# Patient Record
Sex: FEMALE | Race: OTHER | HISPANIC OR LATINO | Employment: OTHER | URBAN - METROPOLITAN AREA
[De-identification: names, ages, dates, MRNs, and addresses within clinical notes are randomized per-mention and may not be internally consistent; named-entity substitution may affect disease eponyms.]

---

## 2018-07-13 ENCOUNTER — TRANSCRIBE ORDERS (OUTPATIENT)
Dept: ADMINISTRATIVE | Facility: HOSPITAL | Age: 53
End: 2018-07-13

## 2018-07-13 ENCOUNTER — APPOINTMENT (OUTPATIENT)
Dept: LAB | Facility: HOSPITAL | Age: 53
End: 2018-07-13
Payer: COMMERCIAL

## 2018-07-13 DIAGNOSIS — E55.9 VITAMIN D DEFICIENCY DISEASE: ICD-10-CM

## 2018-07-13 DIAGNOSIS — E03.4 HYPOTHYROIDISM DUE TO ACQUIRED ATROPHY OF THYROID: ICD-10-CM

## 2018-07-13 DIAGNOSIS — E03.4 HYPOTHYROIDISM DUE TO ACQUIRED ATROPHY OF THYROID: Primary | ICD-10-CM

## 2018-07-13 LAB
25(OH)D3 SERPL-MCNC: 45.6 NG/ML (ref 30–100)
CHOLEST SERPL-MCNC: 319 MG/DL
HDLC SERPL-MCNC: 48 MG/DL (ref 40–59)
LDLC SERPL CALC-MCNC: 199 MG/DL
NONHDLC SERPL-MCNC: 271 MG/DL
TRIGL SERPL-MCNC: 361 MG/DL
TSH SERPL DL<=0.05 MIU/L-ACNC: 48.7 UIU/ML (ref 0.47–4.68)

## 2018-07-13 PROCEDURE — 36415 COLL VENOUS BLD VENIPUNCTURE: CPT

## 2018-07-13 PROCEDURE — 80061 LIPID PANEL: CPT

## 2018-07-13 PROCEDURE — 82306 VITAMIN D 25 HYDROXY: CPT

## 2018-07-13 PROCEDURE — 84443 ASSAY THYROID STIM HORMONE: CPT

## 2018-07-25 DIAGNOSIS — R00.0 INAPPROPRIATE SINUS TACHYCARDIA: Primary | ICD-10-CM

## 2018-11-13 DIAGNOSIS — E78.2 MIXED HYPERLIPIDEMIA: Primary | ICD-10-CM

## 2018-11-19 RX ORDER — ATORVASTATIN CALCIUM 10 MG/1
TABLET, FILM COATED ORAL
Qty: 30 TABLET | Refills: 0 | Status: SHIPPED | OUTPATIENT
Start: 2018-11-19 | End: 2018-12-18 | Stop reason: SDUPTHER

## 2018-12-03 DIAGNOSIS — R00.0 INAPPROPRIATE SINUS TACHYCARDIA: ICD-10-CM

## 2018-12-11 DIAGNOSIS — R00.0 TACHYCARDIA: Primary | ICD-10-CM

## 2018-12-11 RX ORDER — VERAPAMIL HYDROCHLORIDE 240 MG/1
240 TABLET, FILM COATED, EXTENDED RELEASE ORAL DAILY
Qty: 30 TABLET | Refills: 6 | Status: SHIPPED | OUTPATIENT
Start: 2018-12-11

## 2018-12-17 DIAGNOSIS — E78.2 MIXED HYPERLIPIDEMIA: ICD-10-CM

## 2018-12-18 RX ORDER — ATORVASTATIN CALCIUM 10 MG/1
TABLET, FILM COATED ORAL
Qty: 30 TABLET | Refills: 0 | Status: SHIPPED | OUTPATIENT
Start: 2018-12-18 | End: 2019-01-16 | Stop reason: SDUPTHER

## 2019-01-16 DIAGNOSIS — E78.2 MIXED HYPERLIPIDEMIA: ICD-10-CM

## 2019-01-16 RX ORDER — ATORVASTATIN CALCIUM 10 MG/1
TABLET, FILM COATED ORAL
Qty: 30 TABLET | Refills: 0 | Status: SHIPPED | OUTPATIENT
Start: 2019-01-16 | End: 2019-02-13 | Stop reason: SDUPTHER

## 2019-02-13 DIAGNOSIS — E78.2 MIXED HYPERLIPIDEMIA: ICD-10-CM

## 2019-02-13 RX ORDER — ATORVASTATIN CALCIUM 10 MG/1
TABLET, FILM COATED ORAL
Qty: 30 TABLET | Refills: 0 | Status: SHIPPED | OUTPATIENT
Start: 2019-02-13 | End: 2019-03-13 | Stop reason: SDUPTHER

## 2019-03-13 DIAGNOSIS — E78.2 MIXED HYPERLIPIDEMIA: ICD-10-CM

## 2019-03-13 RX ORDER — ATORVASTATIN CALCIUM 10 MG/1
TABLET, FILM COATED ORAL
Qty: 30 TABLET | Refills: 0 | Status: SHIPPED | OUTPATIENT
Start: 2019-03-13 | End: 2019-04-14 | Stop reason: SDUPTHER

## 2019-04-13 DIAGNOSIS — E78.2 MIXED HYPERLIPIDEMIA: ICD-10-CM

## 2019-04-14 RX ORDER — ATORVASTATIN CALCIUM 10 MG/1
TABLET, FILM COATED ORAL
Qty: 30 TABLET | Refills: 0 | Status: SHIPPED | OUTPATIENT
Start: 2019-04-14 | End: 2019-05-13 | Stop reason: SDUPTHER

## 2019-05-13 DIAGNOSIS — E78.2 MIXED HYPERLIPIDEMIA: ICD-10-CM

## 2019-05-13 RX ORDER — ATORVASTATIN CALCIUM 10 MG/1
TABLET, FILM COATED ORAL
Qty: 30 TABLET | Refills: 0 | Status: SHIPPED | OUTPATIENT
Start: 2019-05-13 | End: 2019-06-18 | Stop reason: SDUPTHER

## 2019-06-18 DIAGNOSIS — E78.2 MIXED HYPERLIPIDEMIA: ICD-10-CM

## 2019-06-20 RX ORDER — ATORVASTATIN CALCIUM 10 MG/1
TABLET, FILM COATED ORAL
Qty: 30 TABLET | Refills: 0 | Status: SHIPPED | OUTPATIENT
Start: 2019-06-20 | End: 2019-07-28 | Stop reason: SDUPTHER

## 2019-07-28 DIAGNOSIS — E78.2 MIXED HYPERLIPIDEMIA: ICD-10-CM

## 2019-08-07 RX ORDER — ATORVASTATIN CALCIUM 10 MG/1
TABLET, FILM COATED ORAL
Qty: 30 TABLET | Refills: 0 | Status: SHIPPED | OUTPATIENT
Start: 2019-08-07

## 2020-03-03 ENCOUNTER — APPOINTMENT (EMERGENCY)
Dept: RADIOLOGY | Facility: HOSPITAL | Age: 55
End: 2020-03-03
Payer: COMMERCIAL

## 2020-03-03 ENCOUNTER — APPOINTMENT (EMERGENCY)
Dept: CT IMAGING | Facility: HOSPITAL | Age: 55
End: 2020-03-03
Payer: COMMERCIAL

## 2020-03-03 ENCOUNTER — HOSPITAL ENCOUNTER (EMERGENCY)
Facility: HOSPITAL | Age: 55
Discharge: HOME | End: 2020-03-03
Attending: EMERGENCY MEDICINE | Admitting: EMERGENCY MEDICINE
Payer: COMMERCIAL

## 2020-03-03 VITALS
SYSTOLIC BLOOD PRESSURE: 158 MMHG | TEMPERATURE: 98.2 F | DIASTOLIC BLOOD PRESSURE: 93 MMHG | OXYGEN SATURATION: 98 % | HEART RATE: 96 BPM | RESPIRATION RATE: 16 BRPM | WEIGHT: 148.15 LBS

## 2020-03-03 DIAGNOSIS — I10 HYPERTENSION: ICD-10-CM

## 2020-03-03 DIAGNOSIS — R07.89 ATYPICAL CHEST PAIN: Primary | ICD-10-CM

## 2020-03-03 LAB
ALBUMIN SERPL BCP-MCNC: 4.8 G/DL (ref 3–5.2)
ALP SERPL-CCNC: 59 U/L (ref 43–122)
ALT SERPL W P-5'-P-CCNC: 27 U/L (ref 9–52)
ANION GAP SERPL CALCULATED.3IONS-SCNC: 11 MMOL/L (ref 5–14)
AST SERPL W P-5'-P-CCNC: 23 U/L (ref 14–36)
ATRIAL RATE: 112 BPM
BACTERIA UR QL AUTO: ABNORMAL /HPF
BASOPHILS # BLD AUTO: 0.1 THOUSANDS/ΜL (ref 0–0.1)
BASOPHILS NFR BLD AUTO: 1 % (ref 0–1)
BILIRUB SERPL-MCNC: 0.3 MG/DL
BILIRUB UR QL STRIP: NEGATIVE
BUN SERPL-MCNC: 12 MG/DL (ref 5–25)
CALCIUM SERPL-MCNC: 10.2 MG/DL (ref 8.4–10.2)
CHLORIDE SERPL-SCNC: 103 MMOL/L (ref 97–108)
CLARITY UR: CLEAR
CO2 SERPL-SCNC: 27 MMOL/L (ref 22–30)
COLOR UR: YELLOW
CREAT SERPL-MCNC: 0.69 MG/DL (ref 0.6–1.2)
D DIMER PPP FEU-MCNC: <0.19 UG/ML FEU
EOSINOPHIL # BLD AUTO: 0.2 THOUSAND/ΜL (ref 0–0.4)
EOSINOPHIL NFR BLD AUTO: 2 % (ref 0–6)
ERYTHROCYTE [DISTWIDTH] IN BLOOD BY AUTOMATED COUNT: 14 %
FLUAV RNA NPH QL NAA+PROBE: NORMAL
FLUBV RNA NPH QL NAA+PROBE: NORMAL
GFR SERPL CREATININE-BSD FRML MDRD: 100 ML/MIN/1.73SQ M
GLUCOSE SERPL-MCNC: 104 MG/DL (ref 70–99)
GLUCOSE UR STRIP-MCNC: NEGATIVE MG/DL
HCT VFR BLD AUTO: 40.7 % (ref 36–46)
HGB BLD-MCNC: 13.8 G/DL (ref 12–16)
HGB UR QL STRIP.AUTO: 250
KETONES UR STRIP-MCNC: NEGATIVE MG/DL
LEUKOCYTE ESTERASE UR QL STRIP: NEGATIVE
LYMPHOCYTES # BLD AUTO: 3.2 THOUSANDS/ΜL (ref 0.5–4)
LYMPHOCYTES NFR BLD AUTO: 38 % (ref 25–45)
MCH RBC QN AUTO: 30.6 PG (ref 26–34)
MCHC RBC AUTO-ENTMCNC: 33.9 G/DL (ref 31–36)
MCV RBC AUTO: 90 FL (ref 80–100)
MONOCYTES # BLD AUTO: 0.8 THOUSAND/ΜL (ref 0.2–0.9)
MONOCYTES NFR BLD AUTO: 10 % (ref 1–10)
MUCOUS THREADS UR QL AUTO: ABNORMAL
NEUTROPHILS # BLD AUTO: 4.1 THOUSANDS/ΜL (ref 1.8–7.8)
NEUTS SEG NFR BLD AUTO: 49 % (ref 45–65)
NITRITE UR QL STRIP: NEGATIVE
NON-SQ EPI CELLS URNS QL MICRO: ABNORMAL /HPF
P AXIS: 68 DEGREES
PH UR STRIP.AUTO: 7 [PH]
PLATELET # BLD AUTO: 443 THOUSANDS/UL (ref 150–450)
PMV BLD AUTO: 8.1 FL (ref 8.9–12.7)
POTASSIUM SERPL-SCNC: 3.6 MMOL/L (ref 3.6–5)
PR INTERVAL: 118 MS
PROT SERPL-MCNC: 9 G/DL (ref 5.9–8.4)
PROT UR STRIP-MCNC: ABNORMAL MG/DL
QRS AXIS: 57 DEGREES
QRSD INTERVAL: 80 MS
QT INTERVAL: 342 MS
QTC INTERVAL: 466 MS
RBC # BLD AUTO: 4.51 MILLION/UL (ref 4–5.2)
RBC #/AREA URNS AUTO: ABNORMAL /HPF
RSV RNA NPH QL NAA+PROBE: NORMAL
SODIUM SERPL-SCNC: 141 MMOL/L (ref 137–147)
SP GR UR STRIP.AUTO: 1.01 (ref 1–1.04)
T WAVE AXIS: 55 DEGREES
TROPONIN I SERPL-MCNC: <0.01 NG/ML (ref 0–0.03)
TROPONIN I SERPL-MCNC: <0.01 NG/ML (ref 0–0.03)
TSH SERPL DL<=0.05 MIU/L-ACNC: 4.4 UIU/ML (ref 0.47–4.68)
UROBILINOGEN UA: NEGATIVE MG/DL
VENTRICULAR RATE: 112 BPM
WBC # BLD AUTO: 8.5 THOUSAND/UL (ref 4.5–11)
WBC #/AREA URNS AUTO: ABNORMAL /HPF

## 2020-03-03 PROCEDURE — 85025 COMPLETE CBC W/AUTO DIFF WBC: CPT | Performed by: PHYSICIAN ASSISTANT

## 2020-03-03 PROCEDURE — 85379 FIBRIN DEGRADATION QUANT: CPT | Performed by: PHYSICIAN ASSISTANT

## 2020-03-03 PROCEDURE — 84443 ASSAY THYROID STIM HORMONE: CPT | Performed by: PHYSICIAN ASSISTANT

## 2020-03-03 PROCEDURE — 81003 URINALYSIS AUTO W/O SCOPE: CPT | Performed by: PHYSICIAN ASSISTANT

## 2020-03-03 PROCEDURE — 93005 ELECTROCARDIOGRAM TRACING: CPT

## 2020-03-03 PROCEDURE — 71046 X-RAY EXAM CHEST 2 VIEWS: CPT

## 2020-03-03 PROCEDURE — 81001 URINALYSIS AUTO W/SCOPE: CPT | Performed by: PHYSICIAN ASSISTANT

## 2020-03-03 PROCEDURE — 36415 COLL VENOUS BLD VENIPUNCTURE: CPT | Performed by: PHYSICIAN ASSISTANT

## 2020-03-03 PROCEDURE — 93010 ELECTROCARDIOGRAM REPORT: CPT | Performed by: INTERNAL MEDICINE

## 2020-03-03 PROCEDURE — 87631 RESP VIRUS 3-5 TARGETS: CPT | Performed by: PHYSICIAN ASSISTANT

## 2020-03-03 PROCEDURE — 96361 HYDRATE IV INFUSION ADD-ON: CPT

## 2020-03-03 PROCEDURE — 99285 EMERGENCY DEPT VISIT HI MDM: CPT

## 2020-03-03 PROCEDURE — 70450 CT HEAD/BRAIN W/O DYE: CPT

## 2020-03-03 PROCEDURE — 80053 COMPREHEN METABOLIC PANEL: CPT | Performed by: PHYSICIAN ASSISTANT

## 2020-03-03 PROCEDURE — 99285 EMERGENCY DEPT VISIT HI MDM: CPT | Performed by: PHYSICIAN ASSISTANT

## 2020-03-03 PROCEDURE — 84484 ASSAY OF TROPONIN QUANT: CPT | Performed by: PHYSICIAN ASSISTANT

## 2020-03-03 PROCEDURE — 96374 THER/PROPH/DIAG INJ IV PUSH: CPT

## 2020-03-03 RX ORDER — LORAZEPAM 2 MG/ML
0.5 INJECTION INTRAMUSCULAR ONCE
Status: COMPLETED | OUTPATIENT
Start: 2020-03-03 | End: 2020-03-03

## 2020-03-03 RX ORDER — SODIUM CHLORIDE 9 MG/ML
1000 INJECTION, SOLUTION INTRAVENOUS ONCE
Status: COMPLETED | OUTPATIENT
Start: 2020-03-03 | End: 2020-03-03

## 2020-03-03 RX ADMIN — SODIUM CHLORIDE 1000 ML/HR: 0.9 INJECTION, SOLUTION INTRAVENOUS at 09:12

## 2020-03-03 RX ADMIN — LORAZEPAM 0.5 MG: 2 INJECTION INTRAMUSCULAR; INTRAVENOUS at 09:11

## 2020-03-03 NOTE — ED PROVIDER NOTES
History  Chief Complaint   Patient presents with    Chest Pain     states past 2 days heart has been going fast; states 3 years ago the same thing happened and told heart was small so it beats fast; states feeling headache and nauseous this morning with chest pains; chest pains have been on and off over past 2 days  Patient is a 82-year-old female with past medical history inappropriate sinus tachycardia previously on verapamil, history of migraines, COPD, and dyslipidemia who presented today for evaluation of left-sided chest discomfort associated with palpitations that has been present for the past 2 days  Patient followed up with Cardiology in the past and was diagnosed with sinus tachycardia  She reports no significant shortness of breath  She denies any nausea or vomiting  She reports the sensation of her heart beating very fast or palpitations with associated left-sided chest discomfort with squeezing  Chest pain is not exertional   Patient has allergy to aspirin  Patient denies any significant distal sensation or tingling  She reports no pain radiation to her back  No pain radiation down her arm  Prior to Admission Medications   Prescriptions Last Dose Informant Patient Reported? Taking? Methylprednisolone 4 MG TBPK   Yes No   Sig: TK UTD   SUMAtriptan (IMITREX) 50 mg tablet   Yes No   Sig: take 1 tablet by oral route after onset of migraine; may repeat after 2 hours if headache returns,not to exceed 200mg in 24hrs   VENTOLIN  (90 Base) MCG/ACT inhaler   Yes No   Sig: INL 2 PFS PO Q 4 H UTD   albuterol (2 5 mg/3 mL) 0 083 % nebulizer solution   Yes No   Sig: Every six hrs   as needed   albuterol (PROVENTIL HFA) 90 mcg/act inhaler   Yes No   Sig: every 6 (six) hours   atorvastatin (LIPITOR) 10 mg tablet   No No   Sig: TAKE 1 TABLET BY MOUTH EVERY DAY   ergocalciferol (VITAMIN D2) 50,000 units   Yes No   Sig: TK 1 C PO 1 TIME A WK   fenofibrate (TRICOR) 145 mg tablet   Yes No Sig: TK 1 T PO D   fluticasone (FLONASE) 50 mcg/act nasal spray   Yes No   Sig: SHAKE LQ AND U 1 TO 2 SPRAYS IEN QAM   guaiFENesin-dextromethorphan (MUCINEX DM) 600-30 mg   Yes No   Sig: TK 1 T PO BID IN THE MORNING AND AT NIGHT PRN   levothyroxine 50 mcg tablet   Yes No   metaxalone (SKELAXIN) 400 MG tablet   Yes No   mometasone-formoterol (DULERA) 200-5 MCG/ACT inhaler   Yes No   Sig: Inhale 2 puffs   montelukast (SINGULAIR) 10 mg tablet   Yes No   Sig: Take 10 mg by mouth   montelukast (SINGULAIR) 10 mg tablet   Yes No   Sig: TK 1 T PO QD   omeprazole (PRILOSEC) 20 mg delayed release capsule   Yes No   Sig: Every 12 hours   omeprazole (PriLOSEC) 20 mg delayed release capsule   Yes No   Sig: TK 1 C PO BID   oxyCODONE-acetaminophen (PERCOCET) 5-325 mg per tablet   Yes No   Sig: TK 1 T PO Q 8 H PRF SEVERE PAIN   predniSONE 10 mg tablet   Yes No   Sig: Take 10 mg by mouth   ranolazine (RANEXA) 500 mg 12 hr tablet   Yes No   Sig: Every 12 hours   theophylline (THEODUR) 200 mg 12 hr tablet   Yes No   Sig: take 1 tablet by oral route  every 12 hours (only takes daily)   topiramate (TOPAMAX) 25 mg tablet   Yes No   Sig: TK 1 T PO BID   traMADol (ULTRAM) 50 mg tablet   Yes No   Sig: TK 1 T PO  Q 8 H PRF MODERATE P   verapamil (CALAN-SR) 240 mg CR tablet   No No   Sig: Take 1 tablet (240 mg total) by mouth daily      Facility-Administered Medications: None       Past Medical History:   Diagnosis Date    Migraines 09/24/2008       Past Surgical History:   Procedure Laterality Date    BACK SURGERY      HYSTERECTOMY      TUBAL LIGATION         Family History   Problem Relation Age of Onset    Asthma Mother     Diabetes Father         Mellitus     I have reviewed and agree with the history as documented      E-Cigarette/Vaping    E-Cigarette Use Never User      E-Cigarette/Vaping Substances     Social History     Tobacco Use    Smoking status: Former Smoker    Smokeless tobacco: Never Used   Substance Use Topics    Alcohol use: Never     Frequency: Never    Drug use: Never       Review of Systems   Constitutional: Negative  HENT: Negative  Eyes: Negative  Respiratory: Positive for chest tightness  Cardiovascular: Positive for chest pain and palpitations  Gastrointestinal: Negative  Endocrine: Negative  Genitourinary: Negative  Musculoskeletal: Negative  Skin: Negative  Allergic/Immunologic: Negative  Neurological: Negative  Hematological: Negative  Psychiatric/Behavioral: Negative  Physical Exam  Physical Exam   Constitutional: She is oriented to person, place, and time  She appears well-developed and well-nourished  Non-toxic appearance  She does not appear ill  No distress  Neck: Normal range of motion  No hepatojugular reflux and no JVD present  No tracheal deviation present  No thyromegaly present  Cardiovascular: Normal rate and regular rhythm  No systolic murmur is present  Pulses:       Radial pulses are 2+ on the right side, and 2+ on the left side  Pulmonary/Chest: Effort normal and breath sounds normal  No respiratory distress  She has no decreased breath sounds  She has no wheezes  Abdominal: Soft  Bowel sounds are normal  She exhibits no distension  There is no tenderness  Musculoskeletal:        Right lower leg: Normal  She exhibits no edema  Left lower leg: Normal  She exhibits no edema  Neurological: She is alert and oriented to person, place, and time  Skin: Capillary refill takes less than 2 seconds  Psychiatric: She has a normal mood and affect  Her behavior is normal  Her mood appears not anxious  She is not agitated         Vital Signs  ED Triage Vitals   Temperature Pulse Respirations Blood Pressure SpO2   03/03/20 0835 03/03/20 0835 03/03/20 0835 03/03/20 0835 03/03/20 0835   98 2 °F (36 8 °C) (!) 120 16 (!) 195/113 99 %      Temp Source Heart Rate Source Patient Position - Orthostatic VS BP Location FiO2 (%)   03/03/20 0835 03/03/20 0835 03/03/20 0914 03/03/20 0835 --   Tympanic Monitor Lying Left arm       Pain Score       03/03/20 0835       7           Vitals:    03/03/20 0914 03/03/20 0955 03/03/20 1213 03/03/20 1311   BP: 154/94 147/93 (!) 171/108 158/93   Pulse: 101 98 101 96   Patient Position - Orthostatic VS: Lying Lying Lying          Visual Acuity      ED Medications  Medications   sodium chloride 0 9 % infusion (0 mL/hr Intravenous Stopped 3/3/20 1016)   LORazepam (ATIVAN) injection 0 5 mg (0 5 mg Intravenous Given 3/3/20 0911)       Diagnostic Studies  Results Reviewed     Procedure Component Value Units Date/Time    Troponin I [[id number]]  (Normal) Collected:  03/03/20 1211    Lab Status:  Final result Specimen:  Blood from Arm, Right Updated:  03/03/20 1244     Troponin I <0 01 ng/mL     TSH [[id number]]  (Normal) Collected:  03/03/20 0906    Lab Status:  Final result Specimen:  Blood from Arm, Right Updated:  03/03/20 1036     TSH [address] [place], [place] [address]TON 4 400 uIU/mL     Narrative:       Patients undergoing fluorescein dye angiography may retain small amounts of fluorescein in the body for 48-72 hours post procedure  Samples containing fluorescein can produce falsely depressed TSH values  If the patient had this procedure,a specimen should be resubmitted post fluorescein clearance        Influenza A/B and RSV PCR [[id number]]  (Normal) Collected:  03/03/20 0905    Lab Status:  Final result Specimen:  Nares from Nose Updated:  03/03/20 1014     INFLUENZA A PCR None Detected     INFLUENZA B PCR None Detected     RSV PCR None Detected    Urine Microscopic [[id number]]  (Abnormal) Collected:  03/03/20 0914    Lab Status:  Final result Specimen:  Urine, Other Updated:  03/03/20 0959     RBC, UA 10-20 /hpf      WBC, UA 0-1 /hpf      Epithelial Cells Moderate /hpf      Bacteria, UA Occasional /hpf      MUCUS THREADS Moderate    Troponin I [[id number]]  (Normal) Collected:  03/03/20 0906    Lab Status:  Final result Specimen:  Blood from Arm, Right Updated:  03/03/20 0952     Troponin I <0 01 ng/mL     D-dimer, quantitative [[id number]]  (Normal) Collected:  03/03/20 0906    Lab Status:  Final result Specimen:  Blood from Arm, Right Updated:  03/03/20 0946     D-Dimer, Quant <0 19 ug/ml FEU     UA w Reflex to Microscopic w Reflex to Culture [[id number]]  (Abnormal) Collected:  03/03/20 0914    Lab Status:  Final result Specimen:  Urine, Other Updated:  03/03/20 0946     Color, UA Yellow     Clarity, UA Clear     Specific Gravity, UA 1 010     pH, UA 7 0     Leukocytes, UA Negative     Nitrite, UA Negative     Protein, UA 30 (1+) mg/dl      Glucose, UA Negative mg/dl      Ketones, UA Negative mg/dl      Bilirubin, UA Negative     Blood,  0     UROBILINOGEN UA Negative mg/dL     CBC and differential [[id number]]  (Abnormal) Collected:  03/03/20 0906    Lab Status:  Final result Specimen:  Blood from Arm, Right Updated:  03/03/20 0939     WBC 8 50 Thousand/uL      RBC 4 51 Million/uL      Hemoglobin 13 8 g/dL      Hematocrit 40 7 %      MCV 90 fL      MCH 30 6 pg      MCHC 33 9 g/dL      RDW 14 0 %      MPV 8 1 fL      Platelets 468 Thousands/uL      Neutrophils Relative 49 %      Lymphocytes Relative 38 %      Monocytes Relative 10 %      Eosinophils Relative 2 %      Basophils Relative 1 %      Neutrophils Absolute 4 10 Thousands/µL      Lymphocytes Absolute 3 20 Thousands/µL      Monocytes Absolute 0 80 Thousand/µL      Eosinophils Absolute 0 20 Thousand/µL      Basophils Absolute 0 10 Thousands/µL     Comprehensive metabolic panel [[id number]]  (Abnormal) Collected:  03/03/20 0906    Lab Status:  Final result Specimen:  Blood from Arm, Right Updated:  03/03/20 0937     Sodium 141 mmol/L      Potassium 3 6 mmol/L      Chloride 103 mmol/L      CO2 27 mmol/L      ANION GAP 11 mmol/L      BUN 12 mg/dL      Creatinine 0 69 mg/dL      Glucose 104 mg/dL      Calcium 10 2 mg/dL      AST 23 U/L      ALT 27 U/L      Alkaline Phosphatase 59 U/L      Total Protein 9 0 g/dL      Albumin 4 8 g/dL      Total Bilirubin 0 30 mg/dL      eGFR 100 ml/min/1 73sq m     Narrative:       [place] guidelines for Chronic Kidney Disease (CKD):     Stage 1 with normal or high GFR (GFR > 90 mL/min/1 73 square meters)    Stage 2 Mild CKD (GFR = 60-89 mL/min/1 73 square meters)    Stage 3A Moderate CKD (GFR = 45-59 mL/min/1 73 square meters)    Stage 3B Moderate CKD (GFR = 30-44 mL/min/1 73 square meters)    Stage 4 Severe CKD (GFR = 15-29 mL/min/1 73 square meters)    Stage 5 End Stage CKD (GFR <15 mL/min/1 73 square meters)  Note: GFR calculation is accurate only with a steady state creatinine                 XR chest 2 views   Final Result by Cyndi Rene MD (03/03 1301)      No acute cardiopulmonary disease  Workstation performed: [id number]         CT head without contrast   Final Result by Leana Ortiz MD (03/03 1102)      No acute intracranial abnormality                    Workstation performed: [id number]                    Procedures  ECG 12 Lead Documentation Only  Date/Time: 3/3/2020 9:03 AM  Performed by: Sruthi Hanna PA-C  Authorized by: Sruthi Hanna PA-C     Indications / Diagnosis:  Palpitations/chest pain  Patient location:  ED  Interpretation:     Interpretation: normal    Rate:     ECG rate:  112    ECG rate assessment: tachycardic    Rhythm:     Rhythm: sinus rhythm    Ectopy:     Ectopy: none    ST segments:     ST segments:  Normal  T waves:     T waves: normal               ED Course         HEART Risk Score      Most Recent Value   Heart Score Risk Calculator   History  1 Filed at: 03/03/2020 1255   ECG  0 Filed at: 03/03/2020 1255   Age  1 Filed at: 03/03/2020 1255   Risk Factors  1 Filed at: 03/03/2020 1255   Troponin  0 Filed at: 03/03/2020 1255   HEART Score  3 Filed at: 03/03/2020 1255                            MDM  Number of Diagnoses or Management Options  Atypical chest pain:   Hypertension: Diagnosis management comments: Patient is a 61-year-old female presents today for evaluation of chest discomfort  Her heart score is a 3  Her chest pain is nonexertional   EKG is unremarkable  Patient has been seen by cardiology in the past for her sinus tachycardia and was started on verapamil  Patient reports she is currently not taking this medication  Patient was offered admission but opted to follow-up as an outpatient  Patient was given a script for a stress echocardiogram and told to follow-up in the emergency department if her symptoms persisted or worsened  Patient reports that symptoms completely resolved with Ativan  Renal function normal   Troponin x2 negative  D-dimer negative  TSH within normal limits  Patient discharged home with recommendations follow-up with primary care provider with within 1-2 days  Disposition  Final diagnoses:   Atypical chest pain   Hypertension     Time reflects when diagnosis was documented in both MDM as applicable and the Disposition within this note     Time User Action Codes Description Comment    3/3/2020 12:50 PM Brittany Keys Add [R07 89] Atypical chest pain     3/3/2020 12:54 PM [employer] Sports R Add [I10] Hypertension       ED Disposition     ED Disposition Condition Date/Time Comment    Discharge Stable Tue Mar 3, 2020 12:50 PM [facility] discharge to home/self care              Follow-up Information     Follow up With Specialties Details Why Contact Info Additional Information    Sonia Mathew MD Internal Medicine Schedule an appointment as soon as possible for a visit   [address] [facility] [address] [facility] Emergency Department Emergency Medicine  If symptoms worsen [address] [address]  [place] [address]  Cardiology Schedule an appointment as soon as possible for a visit   [facility] [address]  [address] [facility], [address], [facility], [place], [address] [phone]          Discharge Medication List as of 3/3/2020 12:55 PM      CONTINUE these medications which have NOT CHANGED    Details   albuterol (2 5 mg/3 mL) 0 083 % nebulizer solution Every six hrs   as needed, Historical Med      !! albuterol (PROVENTIL HFA) 90 mcg/act inhaler every 6 (six) hours, Starting Mon 7/21/2014, Historical Med      atorvastatin (LIPITOR) 10 mg tablet TAKE 1 TABLET BY MOUTH EVERY DAY, Normal      ergocalciferol (VITAMIN D2) 50,000 units TK 1 C PO 1 TIME A WK, Historical Med      fenofibrate (TRICOR) 145 mg tablet TK 1 T PO D, Historical Med      fluticasone (FLONASE) 50 mcg/act nasal spray SHAKE LQ AND U 1 TO 2 SPRAYS IEN QAM, Historical Med      guaiFENesin-dextromethorphan (MUCINEX DM) 600-30 mg TK 1 T PO BID IN THE MORNING AND AT NIGHT PRN, Historical Med      levothyroxine 50 mcg tablet Starting Mon 10/15/2018, Historical Med      metaxalone (SKELAXIN) 400 MG tablet Starting Tue 8/14/2018, Historical Med      Methylprednisolone 4 MG TBPK TK UTD, Historical Med      mometasone-formoterol (DULERA) 200-5 MCG/ACT inhaler Inhale 2 puffs, Starting Mon 10/5/2015, Historical Med      !! montelukast (SINGULAIR) 10 mg tablet Take 10 mg by mouth, Starting Thu 7/30/2015, Historical Med      !! montelukast (SINGULAIR) 10 mg tablet TK 1 T PO QD, Historical Med      !! omeprazole (PriLOSEC) 20 mg delayed release capsule TK 1 C PO BID, Historical Med      !! omeprazole (PRILOSEC) 20 mg delayed release capsule Every 12 hours, Starting Mon 7/21/2014, Historical Med      oxyCODONE-acetaminophen (PERCOCET) 5-325 mg per tablet TK 1 T PO Q 8 H PRF SEVERE PAIN, Historical Med      predniSONE 10 mg tablet Take 10 mg by mouth, Starting Thu 7/2/2015, Historical Med      ranolazine (RANEXA) 500 mg 12 hr tablet Every 12 hours, Starting Mon 4/30/2018, Historical Med      SUMAtriptan (IMITREX) 50 mg tablet take 1 tablet by oral route after onset of migraine; may repeat after 2 hours if headache returns,not to exceed 200mg in 24hrs, Historical Med      theophylline (THEODUR) 200 mg 12 hr tablet take 1 tablet by oral route  every 12 hours (only takes daily), Historical Med      topiramate (TOPAMAX) 25 mg tablet TK 1 T PO BID, Historical Med      traMADol (ULTRAM) 50 mg tablet TK 1 T PO  Q 8 H PRF MODERATE P, Historical Med      !! VENTOLIN  (90 Base) MCG/ACT inhaler INL 2 PFS PO Q 4 H UTD, Historical Med      verapamil (CALAN-SR) 240 mg CR tablet Take 1 tablet (240 mg total) by mouth daily, Starting Tue 12/11/2018, Normal       !! - Potential duplicate medications found  Please discuss with provider  Outpatient Discharge Orders   Echo stress test w contrast if indicated   Standing Status: Future Standing Exp   Date: 03/03/24       PDMP Review     None          ED Provider  Electronically Signed by           Abel Hernandez PA-C  03/03/20 5055

## 2020-03-04 NOTE — QUICK NOTE
ADDENDUM 3/3/2020 -     Patient reported that she has had persistent chest pain over the past 2 days  She reports chest pain is squeezing and midsternal to L precordium  Chest pain is not exertional  No n/v/d  +palpitations  Patient reports she has been checking her HR and it has been elevated at 113  She denies any syncope/presyncope  No LE edema  No numbness tingling  A/P  Patient has heart score of 3  Her symptoms resolved with ativan  She reports that she did not want to be admitted for further evaluation  Will d/c home with Rx for stress test to follow up as an outpatient

## 2021-07-05 ENCOUNTER — HOSPITAL ENCOUNTER (EMERGENCY)
Facility: HOSPITAL | Age: 56
Discharge: HOME | End: 2021-07-05
Attending: EMERGENCY MEDICINE
Payer: COMMERCIAL

## 2021-07-05 ENCOUNTER — APPOINTMENT (EMERGENCY)
Dept: RADIOLOGY | Facility: HOSPITAL | Age: 56
End: 2021-07-05
Payer: COMMERCIAL

## 2021-07-05 VITALS
DIASTOLIC BLOOD PRESSURE: 117 MMHG | OXYGEN SATURATION: 99 % | RESPIRATION RATE: 20 BRPM | SYSTOLIC BLOOD PRESSURE: 180 MMHG | TEMPERATURE: 97.6 F | WEIGHT: 160.1 LBS | HEART RATE: 103 BPM

## 2021-07-05 DIAGNOSIS — S90.32XA CONTUSION OF LEFT FOOT, INITIAL ENCOUNTER: Primary | ICD-10-CM

## 2021-07-05 PROCEDURE — 99283 EMERGENCY DEPT VISIT LOW MDM: CPT

## 2021-07-05 PROCEDURE — 99284 EMERGENCY DEPT VISIT MOD MDM: CPT | Performed by: EMERGENCY MEDICINE

## 2021-07-05 PROCEDURE — 73630 X-RAY EXAM OF FOOT: CPT

## 2021-07-05 NOTE — ED PROVIDER NOTES
History  Chief Complaint   Patient presents with    Foot Pain     Pt reports that she hit the 5th toe of her left foot and pain has extended to rest of the foot  Pt took tylenol with no relief  Patient is a 61-year-old female who presents with 3 day history of left foot pain after slipping all trying to get a car and striking  Is able to ambulate  Complaining pain at the area no relief with Tylenol  No numbness weakness or tingling  Is able to ambulate  Prior to Admission Medications   Prescriptions Last Dose Informant Patient Reported? Taking? Methylprednisolone 4 MG TBPK   Yes No   Sig: TK UTD   SUMAtriptan (IMITREX) 50 mg tablet   Yes No   Sig: take 1 tablet by oral route after onset of migraine; may repeat after 2 hours if headache returns,not to exceed 200mg in 24hrs   VENTOLIN  (90 Base) MCG/ACT inhaler   Yes No   Sig: INL 2 PFS PO Q 4 H UTD   albuterol (2 5 mg/3 mL) 0 083 % nebulizer solution   Yes No   Sig: Every six hrs   as needed   albuterol (PROVENTIL HFA) 90 mcg/act inhaler   Yes No   Sig: every 6 (six) hours   atorvastatin (LIPITOR) 10 mg tablet   No No   Sig: TAKE 1 TABLET BY MOUTH EVERY DAY   ergocalciferol (VITAMIN D2) 50,000 units   Yes No   Sig: TK 1 C PO 1 TIME A WK   fenofibrate (TRICOR) 145 mg tablet   Yes No   Sig: TK 1 T PO D   fluticasone (FLONASE) 50 mcg/act nasal spray   Yes No   Sig: SHAKE LQ AND U 1 TO 2 SPRAYS IEN QAM   guaiFENesin-dextromethorphan (MUCINEX DM) 600-30 mg   Yes No   Sig: TK 1 T PO BID IN THE MORNING AND AT NIGHT PRN   levothyroxine 50 mcg tablet   Yes No   metaxalone (SKELAXIN) 400 MG tablet   Yes No   mometasone-formoterol (DULERA) 200-5 MCG/ACT inhaler   Yes No   Sig: Inhale 2 puffs   montelukast (SINGULAIR) 10 mg tablet   Yes No   Sig: Take 10 mg by mouth   montelukast (SINGULAIR) 10 mg tablet   Yes No   Sig: TK 1 T PO QD   omeprazole (PRILOSEC) 20 mg delayed release capsule   Yes No   Sig: Every 12 hours   omeprazole (PriLOSEC) 20 mg delayed release capsule   Yes No   Sig: TK 1 C PO BID   oxyCODONE-acetaminophen (PERCOCET) 5-325 mg per tablet   Yes No   Sig: TK 1 T PO Q 8 H PRF SEVERE PAIN   predniSONE 10 mg tablet   Yes No   Sig: Take 10 mg by mouth   ranolazine (RANEXA) 500 mg 12 hr tablet   Yes No   Sig: Every 12 hours   theophylline (THEODUR) 200 mg 12 hr tablet   Yes No   Sig: take 1 tablet by oral route  every 12 hours (only takes daily)   topiramate (TOPAMAX) 25 mg tablet   Yes No   Sig: TK 1 T PO BID   traMADol (ULTRAM) 50 mg tablet   Yes No   Sig: TK 1 T PO  Q 8 H PRF MODERATE P   verapamil (CALAN-SR) 240 mg CR tablet   No No   Sig: Take 1 tablet (240 mg total) by mouth daily      Facility-Administered Medications: None       Past Medical History:   Diagnosis Date    Migraines 09/24/2008       Past Surgical History:   Procedure Laterality Date    BACK SURGERY      HYSTERECTOMY      TUBAL LIGATION         Family History   Problem Relation Age of Onset    Asthma Mother     Diabetes Father         Mellitus     I have reviewed and agree with the history as documented  E-Cigarette/Vaping    E-Cigarette Use Never User      E-Cigarette/Vaping Substances     Social History     Tobacco Use    Smoking status: Former Smoker    Smokeless tobacco: Never Used   Vaping Use    Vaping Use: Never used   Substance Use Topics    Alcohol use: Never    Drug use: Never       Review of Systems   Constitutional: Negative  HENT: Negative  Eyes: Negative  Respiratory: Negative  Cardiovascular: Negative  Gastrointestinal: Negative  Endocrine: Negative  Genitourinary: Negative  Musculoskeletal: Positive for arthralgias  Skin: Negative  Allergic/Immunologic: Negative  Neurological: Negative  Hematological: Negative  Psychiatric/Behavioral: Negative  All other systems reviewed and are negative  Physical Exam  Physical Exam  Vitals and nursing note reviewed     Constitutional:       Appearance: Normal appearance  She is normal weight  HENT:      Head: Normocephalic and atraumatic  Cardiovascular:      Rate and Rhythm: Normal rate and regular rhythm  Pulses: Normal pulses  Heart sounds: Normal heart sounds  Musculoskeletal:         General: Swelling, tenderness and signs of injury present  No deformity  Cervical back: Normal range of motion and neck supple  Right lower leg: No edema  Left lower leg: No edema  Feet:    Skin:     General: Skin is warm and dry  Capillary Refill: Capillary refill takes less than 2 seconds  Neurological:      General: No focal deficit present  Mental Status: She is alert and oriented to person, place, and time  Psychiatric:         Mood and Affect: Mood normal          Behavior: Behavior normal          Vital Signs  ED Triage Vitals [07/05/21 1507]   Temperature Pulse Respirations Blood Pressure SpO2   97 6 °F (36 4 °C) 103 20 (!) 180/117 99 %      Temp Source Heart Rate Source Patient Position - Orthostatic VS BP Location FiO2 (%)   Tympanic Monitor Sitting Right arm --      Pain Score       7           Vitals:    07/05/21 1507   BP: (!) 180/117   Pulse: 103   Patient Position - Orthostatic VS: Sitting         Visual Acuity      ED Medications  Medications - No data to display    Diagnostic Studies  Results Reviewed     None                 XR foot 3+ views LEFT   ED Interpretation by Conner Bhatti MD (07/05 1543)   No fx  Procedures  Procedures         ED Course  ED Course as of Jul 05 1547 Mon Jul 05, 2021   1543 One over results with patient  Will discharge with which issue  Will call back if x-rays read differently by Radiology                                                MDM  Number of Diagnoses or Management Options     Amount and/or Complexity of Data Reviewed  Tests in the radiology section of CPT®: ordered and reviewed  Independent visualization of images, tracings, or specimens: yes        Disposition  Final diagnoses:   Contusion of left foot, initial encounter     Time reflects when diagnosis was documented in both MDM as applicable and the Disposition within this note     Time User Action Codes Description Comment    7/5/2021  3:45 PM Saran Mims Add [S90 32XA] Contusion of left foot, initial encounter       ED Disposition     ED Disposition Condition Date/Time Comment    Discharge Stable Mon Jul 5, 2021  3:46 PM [facility] discharge to home/self care  Follow-up Information     Follow up With Specialties Details Why [address] [facility] Otf Sutherland MD Internal Medicine   [facility]  [phone]            Patient's Medications   Discharge Prescriptions    No medications on file     No discharge procedures on file      PDMP Review     None          ED Provider  Electronically Signed by           Geraldo Zepeda MD  07/05/21 9748

## 2023-07-16 ENCOUNTER — HOSPITAL ENCOUNTER (EMERGENCY)
Facility: HOSPITAL | Age: 58
Discharge: HOME | End: 2023-07-16
Attending: EMERGENCY MEDICINE
Payer: MEDICARE

## 2023-07-16 VITALS
WEIGHT: 144.18 LBS | TEMPERATURE: 98.6 F | OXYGEN SATURATION: 99 % | RESPIRATION RATE: 20 BRPM | DIASTOLIC BLOOD PRESSURE: 88 MMHG | SYSTOLIC BLOOD PRESSURE: 154 MMHG | HEART RATE: 88 BPM

## 2023-07-16 DIAGNOSIS — R10.13 EPIGASTRIC ABDOMINAL PAIN: Primary | ICD-10-CM

## 2023-07-16 DIAGNOSIS — R19.7 DIARRHEA: ICD-10-CM

## 2023-07-16 PROCEDURE — 99284 EMERGENCY DEPT VISIT MOD MDM: CPT | Performed by: EMERGENCY MEDICINE

## 2023-07-16 PROCEDURE — 99283 EMERGENCY DEPT VISIT LOW MDM: CPT

## 2023-07-16 RX ORDER — LISINOPRIL 20 MG/1
20 TABLET ORAL DAILY
COMMUNITY
Start: 2023-02-03

## 2023-07-16 RX ORDER — LEVOTHYROXINE SODIUM 0.12 MG/1
125 TABLET ORAL DAILY
COMMUNITY

## 2023-07-16 RX ORDER — FAMOTIDINE 10 MG/ML
40 INJECTION, SOLUTION INTRAVENOUS ONCE
Status: DISCONTINUED | OUTPATIENT
Start: 2023-07-16 | End: 2023-07-17 | Stop reason: HOSPADM

## 2023-07-16 RX ORDER — ROSUVASTATIN CALCIUM 10 MG/1
10 TABLET, COATED ORAL DAILY
COMMUNITY
Start: 2023-04-03

## 2023-07-16 RX ORDER — CHLORTHALIDONE 25 MG/1
25 TABLET ORAL DAILY
COMMUNITY
Start: 2023-01-25 | End: 2024-01-25

## 2023-07-16 RX ORDER — ONDANSETRON 2 MG/ML
4 INJECTION INTRAMUSCULAR; INTRAVENOUS ONCE
Status: DISCONTINUED | OUTPATIENT
Start: 2023-07-16 | End: 2023-07-17 | Stop reason: HOSPADM

## 2023-07-17 NOTE — ED PROVIDER NOTES
History  Chief Complaint   Patient presents with   • Epigastric Pain     Epigastric pain for 1.5months, reports having diarrhea every time she eats good. Denies any vomiting. Denies any blood in stool. Patient reports 1 month of epigastric burning pain present all the time but progressively worse associated with acid brash. Over the same. She has also been experiencing diarrhea, it does not occur every day often alternates days but when it does occur she may have it as many as 6 times a day. There is no blood or mucus or melena in the diarrhea. She has had no fevers or chills no chest pain or shortness of breath no dysuria hematuria frequency. She has a history of migraines thyroid disease peptic ulcer disease and is taking omeprazole. Prior to Admission Medications   Prescriptions Last Dose Informant Patient Reported? Taking?    SUMAtriptan (IMITREX) 50 mg tablet   Yes No   Sig: take 1 tablet by oral route after onset of migraine; may repeat after 2 hours if headache returns,not to exceed 200mg in 24hrs   VENTOLIN  (90 Base) MCG/ACT inhaler   Yes No   Sig: INL 2 PFS PO Q 4 H UTD   chlorthalidone 25 mg tablet   Yes Yes   Sig: Take 25 mg by mouth daily   ergocalciferol (VITAMIN D2) 50,000 units   Yes No   Sig: TK 1 C PO 1 TIME A WK   levothyroxine 125 mcg tablet   Yes Yes   Sig: Take 125 mcg by mouth daily   lisinopril (ZESTRIL) 20 mg tablet   Yes Yes   Sig: Take 20 mg by mouth daily   mometasone-formoterol (DULERA) 200-5 MCG/ACT inhaler   Yes No   Sig: Inhale 2 puffs   montelukast (SINGULAIR) 10 mg tablet   Yes No   Sig: TK 1 T PO QD   omeprazole (PriLOSEC) 20 mg delayed release capsule   Yes No   Sig: TK 1 C PO BID   rosuvastatin (CRESTOR) 10 MG tablet   Yes Yes   Sig: Take 10 mg by mouth daily   theophylline (THEODUR) 200 mg 12 hr tablet   Yes No   Sig: take 1 tablet by oral route  every 12 hours (only takes daily)   topiramate (TOPAMAX) 25 mg tablet   Yes No   Sig: TK 1 T PO BID verapamil (CALAN-SR) 240 mg CR tablet   No No   Sig: Take 1 tablet (240 mg total) by mouth daily      Facility-Administered Medications: None       Past Medical History:   Diagnosis Date   • Asthma    • Disease of thyroid gland    • Migraines 09/24/2008       Past Surgical History:   Procedure Laterality Date   • BACK SURGERY     • HYSTERECTOMY     • TUBAL LIGATION         Family History   Problem Relation Age of Onset   • Asthma Mother    • Diabetes Father         Mellitus     I have reviewed and agree with the history as documented. E-Cigarette/Vaping   • E-Cigarette Use Never User      E-Cigarette/Vaping Substances     Social History     Tobacco Use   • Smoking status: Former     Types: Cigarettes   • Smokeless tobacco: Never   Vaping Use   • Vaping Use: Never used   Substance Use Topics   • Alcohol use: Never   • Drug use: Never       Review of Systems   Constitutional: Negative for fever. HENT: Negative for rhinorrhea. Eyes: Negative for visual disturbance. Respiratory: Negative for shortness of breath. Cardiovascular: Negative for chest pain. Gastrointestinal: Positive for abdominal pain and diarrhea. Negative for vomiting. Endocrine: Negative for polydipsia. Genitourinary: Negative for dysuria, frequency and hematuria. Musculoskeletal: Negative for neck stiffness. Skin: Negative for rash. Allergic/Immunologic: Negative for immunocompromised state. Neurological: Negative for speech difficulty, weakness and numbness. Psychiatric/Behavioral: Negative for suicidal ideas. Physical Exam  Physical Exam  Constitutional:       General: She is not in acute distress. HENT:      Head: Normocephalic and atraumatic. Right Ear: External ear normal.      Left Ear: External ear normal.      Nose: Nose normal.      Mouth/Throat:      Pharynx: Oropharynx is clear.    Eyes:      Conjunctiva/sclera: Conjunctivae normal.   Cardiovascular:      Rate and Rhythm: Normal rate and regular rhythm. Heart sounds: Normal heart sounds. No murmur heard. Pulmonary:      Effort: Pulmonary effort is normal.      Breath sounds: Normal breath sounds. Abdominal:      General: Bowel sounds are normal.      Palpations: Abdomen is soft. There is no mass. Tenderness: There is no abdominal tenderness. There is no guarding. Musculoskeletal:         General: No swelling or tenderness. Cervical back: Normal range of motion and neck supple. Right lower leg: No edema. Left lower leg: No edema. Skin:     General: Skin is warm and dry. Capillary Refill: Capillary refill takes less than 2 seconds. Neurological:      General: No focal deficit present. Mental Status: She is alert and oriented to person, place, and time.    Psychiatric:         Mood and Affect: Mood normal.         Vital Signs  ED Triage Vitals [07/16/23 2131]   Temperature Pulse Respirations Blood Pressure SpO2   98.6 °F (37 °C) 88 20 154/88 99 %      Temp Source Heart Rate Source Patient Position - Orthostatic VS BP Location FiO2 (%)   Tympanic Monitor Sitting Left arm --      Pain Score       --           Vitals:    07/16/23 2131   BP: 154/88   Pulse: 88   Patient Position - Orthostatic VS: Sitting         Visual Acuity      ED Medications  Medications   lactated ringers bolus 1,000 mL (has no administration in time range)   ondansetron (ZOFRAN) injection 4 mg (has no administration in time range)   Famotidine (PF) (PEPCID) injection 40 mg (has no administration in time range)       Diagnostic Studies  Results Reviewed     Procedure Component Value Units Date/Time    CBC and differential [[id number]]     Lab Status: No result Specimen: Blood     Comprehensive metabolic panel [[id number]]     Lab Status: No result Specimen: Blood     HS Troponin 0hr (reflex protocol) [[id number]]     Lab Status: No result Specimen: Blood     Lipase [[id number]]     Lab Status: No result Specimen: Blood                  No orders to display              Procedures  Procedures         ED Course  ED Course as of 07/16/23 2239   Carmen Osorio Jul 16, 2023   2200 Pt now declining all testing, says she just wants to go home, understands my rationale for care and risks of refusing workup and treatment including death, disability, long term pain & suffering. SBIRT 22yo+    Flowsheet Row Most Recent Value   Initial Alcohol Screen: US AUDIT-C     1. How often do you have a drink containing alcohol? 0 Filed at: 07/16/2023 2143   2. How many drinks containing alcohol do you have on a typical day you are drinking? 0 Filed at: 07/16/2023 2143   3a. Male UNDER 65: How often do you have five or more drinks on one occasion? 0 Filed at: 07/16/2023 2143   3b. FEMALE Any Age, or MALE 65+: How often do you have 4 or more drinks on one occassion? 0 Filed at: 07/16/2023 2143   Audit-C Score 0 Filed at: 07/16/2023 2143   CARLOS: How many times in the past year have you. .. Used an illegal drug or used a prescription medication for non-medical reasons? Never Filed at: 07/16/2023 2143                    Medical Decision Making  Clinical picture is of 2 processes most likely the first being either gastritis or reflux or recurrence of her peptic ulcer disease, and the second being diarrhea. Plan was to check for any anemia or change in renal function or electrolyte abnormalities as well as check response to famotidine and IV fluids. I also plan to check an EKG troponin and chest x-ray purely given her age even though the story is not strongly suggestive of acute coronary syndrome. However, the patient changed her mind and did not want any of this testing performed and signed [facility] after we had a discussion and she demonstrated to me capacity for decision-making and a full understanding of my rationale for care and the potential consequences of declining the care.     Amount and/or Complexity of Data Reviewed  Labs: ordered. Risk  Prescription drug management. Disposition  Final diagnoses:   Epigastric abdominal pain   Diarrhea     Time reflects when diagnosis was documented in both MDM as applicable and the Disposition within this note     Time User Action Codes Description Comment    7/16/2023 10:02 PM Bradley El Add [R10.13] Epigastric abdominal pain     7/16/2023 10:02 PM Bradley El Add [R19.7] Diarrhea       ED Disposition     ED Disposition   AMA    Condition   --    Date/Time   Sun Jul 16, 2023 10:02 PM    Comment   Date: 7/16/2023  Patient: Carmel Vieira  Admitted: 7/16/2023  9:29 PM  Attending Provider: Idalmis Dow MD    Carmel Vieira or her authorized caregiver has made the decision for the patient to leave the emergency department against the advice of lov ett. She or her authorized caregiver has been informed and understands the inherent risks, including death, disability, pain, suffering. She or her authorized caregiver has decided to accept the responsibility for this decision. Carmel Vieira and all  necessary parties have been advised that she may return for further evaluation or treatment. Her condition at time of discharge was unchanged.   Carmel Vieira had current vital signs as follows:  /88 (BP Location: Left arm)   Pulse 88   Temp 9 8.6 °F (37 °C) (Tympanic)   Resp 20   Wt 65.4 kg (144 lb 2.9 oz)            Follow-up Information     Follow up With Specialties Details Why Contact Info    Renee Bassett MD Internal Medicine Schedule an appointment as soon as possible for a visit in 1 day  [address]  [facility]  [phone]            Discharge Medication List as of 7/16/2023 10:02 PM      CONTINUE these medications which have NOT CHANGED    Details   chlorthalidone 25 mg tablet Take 25 mg by mouth daily, Starting Wed 1/25/2023, Until Thu 1/25/2024, Historical Med      levothyroxine 125 mcg tablet Take 125 mcg by mouth daily, Historical Med      lisinopril (ZESTRIL) 20 mg tablet Take 20 mg by mouth daily, Starting Fri 2/3/2023, Historical Med      rosuvastatin (CRESTOR) 10 MG tablet Take 10 mg by mouth daily, Starting Mon 4/3/2023, Historical Med      ergocalciferol (VITAMIN D2) 50,000 units TK 1 C PO 1 TIME A WK, Historical Med      mometasone-formoterol (DULERA) 200-5 MCG/ACT inhaler Inhale 2 puffs, Starting Mon 10/5/2015, Historical Med      montelukast (SINGULAIR) 10 mg tablet TK 1 T PO QD, Historical Med      omeprazole (PriLOSEC) 20 mg delayed release capsule TK 1 C PO BID, Historical Med      SUMAtriptan (IMITREX) 50 mg tablet take 1 tablet by oral route after onset of migraine; may repeat after 2 hours if headache returns,not to exceed 200mg in 24hrs, Historical Med      theophylline (THEODUR) 200 mg 12 hr tablet take 1 tablet by oral route  every 12 hours (only takes daily), Historical Med      topiramate (TOPAMAX) 25 mg tablet TK 1 T PO BID, Historical Med      VENTOLIN  (90 Base) MCG/ACT inhaler INL 2 PFS PO Q 4 H UTD, Historical Med      verapamil (CALAN-SR) 240 mg CR tablet Take 1 tablet (240 mg total) by mouth daily, Starting Tue 12/11/2018, Normal             No discharge procedures on file.     PDMP Review     None          ED Provider  Electronically Signed by           Sharyn Spurling, MD  07/16/23 [id number]

## 2023-07-17 NOTE — ED NOTES
Patient requesting to go home at this time. She would like to speak with her family doc. Provider made aware.      Maira Guajardo RN  07/16/23 [id number]

## 2023-07-17 NOTE — DISCHARGE INSTRUCTIONS
You are leaving [address] [place] [address], at risk of adverse outcomes including death, or long-term disability, pain and/or suffering. Return to this or another emergency department if you change your mind, develop new symptoms or become worse in any way.  If you do not return to an emergency department, seek prompt medical attention from your primary other MD.